# Patient Record
Sex: MALE | Race: OTHER | NOT HISPANIC OR LATINO | URBAN - METROPOLITAN AREA
[De-identification: names, ages, dates, MRNs, and addresses within clinical notes are randomized per-mention and may not be internally consistent; named-entity substitution may affect disease eponyms.]

---

## 2022-12-04 ENCOUNTER — EMERGENCY (EMERGENCY)
Facility: HOSPITAL | Age: 67
LOS: 1 days | Discharge: HOME | End: 2022-12-04
Attending: EMERGENCY MEDICINE | Admitting: EMERGENCY MEDICINE

## 2022-12-04 VITALS
TEMPERATURE: 98 F | OXYGEN SATURATION: 98 % | RESPIRATION RATE: 18 BRPM | DIASTOLIC BLOOD PRESSURE: 100 MMHG | HEART RATE: 85 BPM | SYSTOLIC BLOOD PRESSURE: 160 MMHG

## 2022-12-04 VITALS — RESPIRATION RATE: 18 BRPM | OXYGEN SATURATION: 100 %

## 2022-12-04 PROCEDURE — 99284 EMERGENCY DEPT VISIT MOD MDM: CPT

## 2022-12-04 NOTE — ED PROVIDER NOTE - PATIENT PORTAL LINK FT
You can access the FollowMyHealth Patient Portal offered by [facility] by registering at the following website: [email or web address]. By joining [facility]’s FollowMyHealth portal, you will also be able to view your health information using other applications (apps) compatible with our system.

## 2022-12-04 NOTE — ED PROVIDER NOTE - ATTENDING CONTRIBUTION TO CARE
GEN - NAD; well appearing; A+O x3   HEAD - NC/AT   EYES- PERRL, EOMI  ENT: Airway patent, mmm, Oral cavity and pharynx normal. No inflammation, swelling, exudate, or lesions.  NECK: Neck supple  PULMONARY - CTA b/l, symmetric breath sounds, unlabored.  CARDIAC -s1s2, RRR, no M,G,R  ABDOMEN - +BS, ND, NT, soft, no guarding, no rebound, no masses   BACK - no CVA tenderness  EXTREMITIES - FROM, no edema   SKIN - no rash or bruising   NEUROLOGIC - alert, speech clear, no focal deficits  PSYCH -nl mood/affect, nl insight.  a/p-has had fever/chills for few d, improved today, took covid test outptnt which was positive, called his pcp who recommended he check his o2 sat. O2 sat on his home device was in 80s so he was advised to come to ed. Did not have any shortness of breath or chest pain, no nausea, no vomiting, no diarrhea, no edema. Feels much better today. Very well appearing on exam, ao3, unlabored breathing, clear lungs, abd soft/nt, no edema. Saturation while conversing as well as during and after ambulation did not drop below 99. At no point did he feel sob or have labored breathing. Recommended cxr however patient declining adamantly responding he doesn't want it at this time. Is lillian po and feels well. Discussed supportive care home methods and strict return precautions.

## 2022-12-04 NOTE — ED PROVIDER NOTE - CLINICAL SUMMARY MEDICAL DECISION MAKING FREE TEXT BOX
This is a 67 year old male with no significant pmh presenting after low o2 saturation measured at home. He has been having fever, chills for 3 days with mild cough. Tested + for covid this morning. High 80s o2 sat measured at home on room air. Completely asymptomatic now. Breathing normally. Chest clear to ascultation. Saturating above 99% on room air at rest and after ambulating. Offered CXR, and patient declined. Will dc with return precautions.

## 2022-12-04 NOTE — ED PROVIDER NOTE - PHYSICAL EXAMINATION
General: NAD  HEENT: NCAT. Non erythematous, non swollen tonsils. No exudates.  Cardiac: RRR, 2+ radial pulses  Chest: CTA  Abdomen: soft, non-distended, no ttp, no rebound or guarding  Extremities: no peripheral edema, calf tenderness, or leg size discrepancies  Skin: no rashes  Neuro: AAOx3, motor and sensory grossly intact. CN II-XII intact. 5/5 strength upper and lower extremities. Normal sensation bilaterally upper and lower extremities.   Psych: mood and affect appropriate

## 2022-12-04 NOTE — ED PROVIDER NOTE - CARE PLAN
1 Principal Discharge DX:	Shortness of breath   Principal Discharge DX:	2019 novel coronavirus disease (COVID-19)

## 2022-12-04 NOTE — ED PROVIDER NOTE - OBJECTIVE STATEMENT
This is a 67 year old male with no significant pmh presenting after low o2 saturation measured at home. He has been having fever, chills for 3 days with mild cough. Tested + for covid this morning.  He reports that he measured it 3 times at home which showed high 80s. Called his doctor friends and was instructed to come in. Denies any symptoms during this episode or currently. Denies any fever, chills today. Denies any chest pain, shortness of breath, abdominal pain, urinary sxs, lower extremity swelling/pain.

## 2024-01-12 NOTE — ED PROVIDER NOTE - NSFOLLOWUPINSTRUCTIONS_ED_ALL_ED_FT
No signs of emergency medical condition on today's workup.  Presumptive diagnosis made, but further evaluation may be required by your primary care doctor or specialist for a definitive diagnosis.  Therefore, follow up as directed and if symptoms change/worsen or any emergency conditions, please return to the ER.    YOU WERE SEEN FOR low oxygen saturation measured at home.     Your oxygen saturation here has been above 99% on room air at rest and even after walking. The low oxygen measurement at home was most likely due to machine error.     FOLLOW UP WITH YOUR PRIMARY CARE PROVIDER    RETURN TO THE EMERGENCY DEPARTMENT FOR increasing shortness of breath, chest pain, vomiting, severe abdominal pain, or any new/concerning symptoms. Yes